# Patient Record
Sex: FEMALE | Race: WHITE
[De-identification: names, ages, dates, MRNs, and addresses within clinical notes are randomized per-mention and may not be internally consistent; named-entity substitution may affect disease eponyms.]

---

## 2019-03-19 ENCOUNTER — HOSPITAL ENCOUNTER (EMERGENCY)
Dept: HOSPITAL 4 - SED | Age: 74
Discharge: HOME | End: 2019-03-19
Payer: SELF-PAY

## 2019-03-19 VITALS — HEIGHT: 65 IN | BODY MASS INDEX: 24.16 KG/M2 | WEIGHT: 145 LBS

## 2019-03-19 VITALS — SYSTOLIC BLOOD PRESSURE: 176 MMHG

## 2019-03-19 VITALS — SYSTOLIC BLOOD PRESSURE: 218 MMHG

## 2019-03-19 DIAGNOSIS — R04.0: Primary | ICD-10-CM

## 2019-03-19 DIAGNOSIS — I10: ICD-10-CM

## 2019-03-19 LAB
ALBUMIN SERPL BCP-MCNC: 3.8 G/DL (ref 3.4–4.8)
ALT SERPL W P-5'-P-CCNC: 38 U/L (ref 12–78)
ANION GAP SERPL CALCULATED.3IONS-SCNC: 6 MMOL/L (ref 5–15)
AST SERPL W P-5'-P-CCNC: 25 U/L (ref 10–37)
BASOPHILS # BLD AUTO: 0 K/UL (ref 0–0.2)
BASOPHILS NFR BLD AUTO: 0.4 % (ref 0–2)
BILIRUB SERPL-MCNC: 0.4 MG/DL (ref 0–1)
BUN SERPL-MCNC: 13 MG/DL (ref 8–21)
CALCIUM SERPL-MCNC: 9.6 MG/DL (ref 8.4–11)
CHLORIDE SERPL-SCNC: 101 MMOL/L (ref 98–107)
CREAT SERPL-MCNC: 0.7 MG/DL (ref 0.55–1.3)
EOSINOPHIL # BLD AUTO: 0 K/UL (ref 0–0.4)
EOSINOPHIL NFR BLD AUTO: 0.3 % (ref 0–4)
ERYTHROCYTE [DISTWIDTH] IN BLOOD BY AUTOMATED COUNT: 12.8 % (ref 9–15)
GFR SERPL CREATININE-BSD FRML MDRD: (no result) ML/MIN (ref 90–?)
GLUCOSE SERPL-MCNC: 179 MG/DL (ref 70–99)
HCT VFR BLD AUTO: 39.1 % (ref 36–48)
HGB BLD-MCNC: 13.5 G/DL (ref 12–16)
INR PPP: 1 (ref 0.8–1.2)
LYMPHOCYTES # BLD AUTO: 1.1 K/UL (ref 1–5.5)
LYMPHOCYTES NFR BLD AUTO: 10.3 % (ref 20.5–51.5)
MCH RBC QN AUTO: 31 PG (ref 27–31)
MCHC RBC AUTO-ENTMCNC: 35 % (ref 32–36)
MCV RBC AUTO: 91 FL (ref 79–98)
MONOCYTES # BLD MANUAL: 0.5 K/UL (ref 0–1)
MONOCYTES # BLD MANUAL: 4.5 % (ref 1.7–9.3)
NEUTROPHILS # BLD AUTO: 9 K/UL (ref 1.8–7.7)
NEUTROPHILS NFR BLD AUTO: 84.5 % (ref 40–70)
PLATELET # BLD AUTO: 355 K/UL (ref 130–430)
POTASSIUM SERPL-SCNC: 4.2 MMOL/L (ref 3.5–5.1)
PROTHROMBIN TIME: 10.6 SECS (ref 9.5–12.5)
RBC # BLD AUTO: 4.31 MIL/UL (ref 4.2–6.2)
SODIUM SERPLBLD-SCNC: 134 MMOL/L (ref 136–145)
WBC # BLD AUTO: 10.6 K/UL (ref 4.8–10.8)

## 2019-03-19 PROCEDURE — 30901 CONTROL OF NOSEBLEED: CPT

## 2019-03-19 PROCEDURE — 80053 COMPREHEN METABOLIC PANEL: CPT

## 2019-03-19 PROCEDURE — 85025 COMPLETE CBC W/AUTO DIFF WBC: CPT

## 2019-03-19 PROCEDURE — 99284 EMERGENCY DEPT VISIT MOD MDM: CPT

## 2019-03-19 PROCEDURE — 93005 ELECTROCARDIOGRAM TRACING: CPT

## 2019-03-19 PROCEDURE — 85610 PROTHROMBIN TIME: CPT

## 2019-03-19 PROCEDURE — 36415 COLL VENOUS BLD VENIPUNCTURE: CPT

## 2019-03-19 PROCEDURE — 96372 THER/PROPH/DIAG INJ SC/IM: CPT

## 2019-03-20 ENCOUNTER — HOSPITAL ENCOUNTER (EMERGENCY)
Dept: HOSPITAL 4 - SED | Age: 74
Discharge: HOME | End: 2019-03-20
Payer: SELF-PAY

## 2019-03-20 VITALS — WEIGHT: 145 LBS | SYSTOLIC BLOOD PRESSURE: 167 MMHG | HEIGHT: 65 IN | BODY MASS INDEX: 24.16 KG/M2

## 2019-03-20 VITALS — SYSTOLIC BLOOD PRESSURE: 150 MMHG

## 2019-03-20 DIAGNOSIS — R04.0: Primary | ICD-10-CM

## 2019-03-20 DIAGNOSIS — I10: ICD-10-CM

## 2019-03-20 NOTE — NUR
Pt AAOx4, [place] speaking only, presents to ED c/o continued bleeding to L 
nare x 2 days. Pt was seen in ED yesterday, rhino rocket was placed in L nare. 
Pt reports pressure from rhino rocket, but denies pain at the moment. Son at 
bedside states pt is concerned that bleeding is continuing, and is unsure of 
what to do. Pt is attempting to find a PMD that is available for an appointment 
this week. No other injuries/complaints per pt/noted. Will continue to monitor.

## 2019-03-20 NOTE — NUR
Patient given written and verbal discharge instructions and verbalizes 
understanding.  ER MD Vang discussed with patient the results and treatment 
provided. Patient in stable condition. ID arm band removed. No Rx given. 
Patient educated on pain management and to follow up with PMD. Pain Scale 0. 
Opportunity for questions provided and answered. Medication side effect fact 
sheet provided.

## 2019-03-22 ENCOUNTER — HOSPITAL ENCOUNTER (EMERGENCY)
Dept: HOSPITAL 4 - SED | Age: 74
Discharge: HOME | End: 2019-03-22
Payer: SELF-PAY

## 2019-03-22 VITALS — HEIGHT: 65 IN | WEIGHT: 145 LBS | BODY MASS INDEX: 24.16 KG/M2

## 2019-03-22 VITALS — SYSTOLIC BLOOD PRESSURE: 184 MMHG

## 2019-03-22 DIAGNOSIS — R04.0: Primary | ICD-10-CM

## 2019-03-22 NOTE — NUR
Pt brought by family member,A&Ox4, pt presents to ER to remove L nostril Rhino 
ROcket placed 3 days ago, no bleeding noted at this time ,no pain , Dr Wilburn 
notified of high BP.

## 2019-03-22 NOTE — NUR
Patient given written and verbal discharge instructions and verbalizes 
understanding.  ER MD discussed with patient the results and treatment 
provided. Patient in stable condition. ID arm band removed. 

No Rx  given. Patient educated on pain management and to follow up with PMD. 
Pain Scale 0/10 .

Opportunity for questions provided and answered. Medication side effect fact 
sheet provided.